# Patient Record
Sex: FEMALE | Race: WHITE | HISPANIC OR LATINO
[De-identification: names, ages, dates, MRNs, and addresses within clinical notes are randomized per-mention and may not be internally consistent; named-entity substitution may affect disease eponyms.]

---

## 2022-01-03 ENCOUNTER — APPOINTMENT (OUTPATIENT)
Dept: ORTHOPEDIC SURGERY | Facility: CLINIC | Age: 42
End: 2022-01-03
Payer: COMMERCIAL

## 2022-01-03 VITALS — HEIGHT: 55 IN | BODY MASS INDEX: 22.91 KG/M2 | WEIGHT: 99 LBS

## 2022-01-03 DIAGNOSIS — Z87.39 PERSONAL HISTORY OF OTHER DISEASES OF THE MUSCULOSKELETAL SYSTEM AND CONNECTIVE TISSUE: ICD-10-CM

## 2022-01-03 PROBLEM — Z00.00 ENCOUNTER FOR PREVENTIVE HEALTH EXAMINATION: Status: ACTIVE | Noted: 2022-01-03

## 2022-01-03 PROCEDURE — 99204 OFFICE O/P NEW MOD 45 MIN: CPT

## 2022-01-03 PROCEDURE — 73030 X-RAY EXAM OF SHOULDER: CPT | Mod: RT

## 2022-01-03 NOTE — HISTORY OF PRESENT ILLNESS
[de-identified] : This patient is a 41-year-old right-hand-dominant female who had a double mastectomy performed about 3 months ago. 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 She had a prior one several years ago with the left I took about 2 or 3 years to resolve. This was due to not using her arms because of healing of the mastectomy. She has pain in her right shoulder left shoulder is not painful.

## 2022-01-03 NOTE — REVIEW OF SYSTEMS
[Arthralgia] : arthralgia [Joint Stiffness] : joint stiffness [Negative] : Heme/Lymph [Joint Pain] : joint pain [Joint Swelling] : no joint swelling

## 2022-01-03 NOTE — PHYSICAL EXAM
[de-identified] : Right shoulder shows no warmth or swelling some tenderness to palpation of the glenohumeral joint forward elevation is 110° abduction is about 60° external rotation is 30° and internal rotation is to the side\par \par Left shoulder shows forward elevation 120° abduction 90° external rotation 45° and internal rotation to the L2 vertebra. Positive Neer impingement of both shoulders neurovascular exam is normal. [de-identified] : Right shoulder x-ray shows no evidence of acute fracture or dislocation

## 2022-01-03 NOTE — DISCUSSION/SUMMARY
[Medication Risks Reviewed] : Medication risks reviewed [de-identified] : She is going to have a reconstruction of her breasts in the month of January. I don't she's a candidate for cortisone but she will discuss this Friday with her breast surgeon. She can continue with physical therapy. Discussed a manipulation under anesthesia to time when she has her breast surgery. I prescribed meloxicam. Followup will be by the portal.

## 2022-01-18 ENCOUNTER — TRANSCRIPTION ENCOUNTER (OUTPATIENT)
Age: 42
End: 2022-01-18

## 2022-01-19 ENCOUNTER — APPOINTMENT (OUTPATIENT)
Dept: ORTHOPEDIC SURGERY | Facility: CLINIC | Age: 42
End: 2022-01-19
Payer: COMMERCIAL

## 2022-01-19 DIAGNOSIS — M25.511 PAIN IN RIGHT SHOULDER: ICD-10-CM

## 2022-01-19 DIAGNOSIS — M75.51 BURSITIS OF RIGHT SHOULDER: ICD-10-CM

## 2022-01-19 PROCEDURE — 20610 DRAIN/INJ JOINT/BURSA W/O US: CPT | Mod: RT

## 2022-01-19 NOTE — PROCEDURE
[de-identified] : After discussion of the risks and benefits, the patient has elected to proceed with an injection. Confirmed that the patient does not have a history of prior adverse reactions, active infections are relevant allergies. There was no erythema, warmth and the skin was clear. The skin was sterilized with alcohol. Ethyl chloride was used as a topical anesthetic. A needle was inserted. The site was injected with a mixture of Kenalog and local anesthetic. The injection was completed without complication and a bandage was applied. The patient tolerated the procedure well and was given post injection instructions.\par \par Medications: right shoulder glenohumeral and subacromial cortisone injection 10 mg of Kenalog and 10 cc 1% lidocaine

## 2022-01-19 NOTE — REVIEW OF SYSTEMS
[Arthralgia] : arthralgia [Joint Pain] : joint pain [Joint Stiffness] : joint stiffness [Joint Swelling] : no joint swelling [Negative] : Heme/Lymph normal...

## 2022-01-19 NOTE — PHYSICAL EXAM
[de-identified] : Right shoulder shows a positive Neer impingement sign a positive Lima test. She has forward elevation 90° external rotation 30° internal rotation to the side abduction 45° left shoulder shows forward elevation 130°, abduction 90° external rotation 45° internal rotation to the L3 vertebra positive Neer impingement positive Lima on the left. Neurovascular exam is normal of both.

## 2022-01-19 NOTE — HISTORY OF PRESENT ILLNESS
[de-identified] : This patient is here for followup of her right and left shoulder. The right is worse than the left her she discussed the possibility of an injection with her treating surgeon and he has no objection to it. She does have a frozen shoulder with right greater than left the notch the reason for doing the injection. She is doing the therapy.

## 2022-01-19 NOTE — DISCUSSION/SUMMARY
[Medication Risks Reviewed] : Medication risks reviewed [de-identified] : Again we discussed treatment options. She is given a cortisone injection for her right frozen shoulder today. She will followup in one month if there is no improvement. He could consider another injection. Postinjection instructions given. She'll begin some physical therapy again.anti-inflammatory medications discussed

## 2022-01-20 ENCOUNTER — TRANSCRIPTION ENCOUNTER (OUTPATIENT)
Age: 42
End: 2022-01-20

## 2022-01-24 ENCOUNTER — TRANSCRIPTION ENCOUNTER (OUTPATIENT)
Age: 42
End: 2022-01-24

## 2023-02-24 ENCOUNTER — APPOINTMENT (OUTPATIENT)
Dept: ORTHOPEDIC SURGERY | Facility: CLINIC | Age: 43
End: 2023-02-24
Payer: COMMERCIAL

## 2023-02-24 DIAGNOSIS — M75.01 ADHESIVE CAPSULITIS OF RIGHT SHOULDER: ICD-10-CM

## 2023-02-24 PROCEDURE — 73030 X-RAY EXAM OF SHOULDER: CPT | Mod: RT

## 2023-02-24 PROCEDURE — 99214 OFFICE O/P EST MOD 30 MIN: CPT | Mod: 25

## 2023-02-24 PROCEDURE — 20611 DRAIN/INJ JOINT/BURSA W/US: CPT | Mod: RT

## 2023-02-24 NOTE — PROCEDURE
[de-identified] : INJECTION RIGHT SHOULDER GH JOINT AND SA SPACE \par \par Patient has demonstrated limited relief from NSAIDS, rest, exercises / PT, and after discussion of the risks and benefits, the patient has elected to proceed with an ULTRASOUND GUIDED injection into the RIGHT GLENOHUMERAL JOINT AND SA SPACE \par  \par Confirmed that the patient does not have history of prior adverse reactions, active, infections, or relevant allergies. There was no effusion, erythema, or warmth, and the skin was clear\par \par The skin was sterilized with alcohol. Ethyl Chloride was used as a topical anesthetic. Routine sterile technique. \par The site was injected UTILIZING ULTRASOUND GUIDANCE to confirm appropriate placement of the needle-\par with a mixture of medication and local anesthetic. The injection was completed without complication and a bandage was applied.\par  \par The patient tolerated the procedure well and was given post-injection instructions.Rec: Cold therapy, analgesics, avoid heavy activity.\par MEDICATION: 4cc of 1% xylocaine + 20mg of KENALOG EACH SITE \par \par

## 2023-02-24 NOTE — HISTORY OF PRESENT ILLNESS
[de-identified] : RIGHT SHOULDER PAIN - SINCE SEPTEMBER 2021 - BILAT MASTECTOMY \par FOLLOW UP- PT HAS BEEN SEEING DR. MENDEZ FOR RIGHT SHOULDER-WAS TREATED WITH CORTISONE INJECTION FOR FROZEN SHOULDER\par MAY 2022 RIGHT SHOULDER FRACTURE  TREATED WITH SLING \par \par PAIN LEVEL: 5/10 \par PT IS GOING TO P.T 1 X WEEK (PT DID MORE THAN 12 SESSIONS) - SOME RELIEF

## 2023-02-24 NOTE — DISCUSSION/SUMMARY
[de-identified] : ULTRASOUND EVALUATION  REVEALS INFLAMMATORY CHANGES WITHOUT SIGNIFICANT TEAR \par PATIENT HAS ELECTED TO PROCEED WITH 2ND KENALOG INJECTION SHOULDER \par RISKS AND BENEFITS DISCUSSED - VERBAL CONSENT OBTAINED \par SEE PROCEDURE NOTE\par \par \par POST INJECTION INSTRUCTIONS:\par \par INJECTION THERAPY HANDOUT PROVIDED\par \par COLD THERAPY , ANALGESICS PRN\par \par HOME  EXERCISES QD - FROZEN SHOULDER \par PULLEY EXERCISE DAILY \par \par RESUME  P.T.  1 X WEEK  \par \par MRI IF NO RELIEF 12 WEEKS\par

## 2023-02-24 NOTE — PHYSICAL EXAM
[de-identified] : PHYSICAL EXAM  RIGHT  SHOULDER\par \par NORMAL POSTURE / SCAPULAR PROTRACTION\par AROM 120 / 110 / 70 / 0 \par TENDER: SA REGION / AC JOINT / GH JOINT / BICEPS GROOVE\par \par SPECIAL TESTING :\par VIVAR - POSITIVE \par CARLOS - POSITIVE \par SPEED TEST - POSITIVE\par \par ESCOBAR - NEGATIVE \par APPREHENSION AND SUPPRESSION - NEGATIVE \par \par RC STRENGTH TESTING \par SS:  5/5\par SUB 5/5\par IS     5/5\par BICEPS  5/5\par \par SENSATION  - GROSSLY INTACT\par \par \par  [de-identified] : RIGHT SHOULDER XRAY (2 VIEWS - AP AND OUTLET) -  \par HEALED FRACTURE \par NO OBVIOUS FRACTURE ,  SEPARATION OR DISLOCATION \par NO SIGNIFICANT OSTEOARTHRITIS,\par TYPE 2B ACROMION - LOW LYING \par CSA= 49 - LATERAL SPUR\par

## 2023-05-05 ENCOUNTER — APPOINTMENT (OUTPATIENT)
Dept: ORTHOPEDIC SURGERY | Facility: CLINIC | Age: 43
End: 2023-05-05
Payer: COMMERCIAL

## 2023-05-05 DIAGNOSIS — M67.813 OTHER SPECIFIED DISORDERS OF TENDON, RIGHT SHOULDER: ICD-10-CM

## 2023-05-05 PROCEDURE — 20611 DRAIN/INJ JOINT/BURSA W/US: CPT | Mod: RT

## 2023-05-05 PROCEDURE — 99213 OFFICE O/P EST LOW 20 MIN: CPT | Mod: 25

## 2023-05-05 RX ORDER — TRAMADOL HYDROCHLORIDE 50 MG/1
50 TABLET, COATED ORAL
Qty: 56 | Refills: 0 | Status: ACTIVE | COMMUNITY
Start: 2023-05-05 | End: 1900-01-01

## 2023-05-05 NOTE — PHYSICAL EXAM
[de-identified] : PHYSICAL EXAM  RIGHT  SHOULDER\par \par NORMAL POSTURE / SCAPULAR PROTRACTION\par AROM 140 / 140 / 70 / 5 \par TENDER: SA REGION ANTERIOR \par \par SPECIAL TESTING :\par VIVAR - POSITIVE \par CARLOS - POSITIVE \par SPEED TEST - POSITIVE\par \par ESCOBAR - NEGATIVE \par APPREHENSION AND SUPPRESSION - NEGATIVE \par \par RC STRENGTH TESTING \par SS:  5/5\par SUB 5/5\par IS     5/5\par BICEPS  5/5\par \par SENSATION  - GROSSLY INTACT\par \par \par

## 2023-05-05 NOTE — DISCUSSION/SUMMARY
[de-identified] : \par PATIENT HAS ELECTED TO PROCEED WITH KENALOG INJECTION SHOULDER \par RISKS AND BENEFITS DISCUSSED - VERBAL CONSENT OBTAINED \par SEE PROCEDURE NOTE\par \par \par POST INJECTION INSTRUCTIONS:\par \par INJECTION THERAPY HANDOUT PROVIDED\par \par COLD OR HEAT  THERAPY , ANALGESICS PRN\par \par HOME  EXERCISES QD - CORNER STRETCH AND SLEEPER STRETCH \par \par COMPLETE P.T.  1 X WEEK\par \par MRI IF NO RELIEF 12 WEEKS\par

## 2023-05-05 NOTE — PROCEDURE
[de-identified] : INJECTION RIGHT SHOULDER GH JOINT AND SA SPACE \par \par Patient has demonstrated limited relief from NSAIDS, rest, exercises / PT, and after discussion of the risks and benefits, the patient has elected to proceed with an ULTRASOUND GUIDED injection into the RIGHT GLENOHUMERAL JOINT AND SA SPACE \par  \par Confirmed that the patient does not have history of prior adverse reactions, active, infections, or relevant allergies. There was no effusion, erythema, or warmth, and the skin was clear\par \par The skin was sterilized with alcohol. Ethyl Chloride was used as a topical anesthetic. Routine sterile technique. \par The site was injected UTILIZING ULTRASOUND GUIDANCE to confirm appropriate placement of the needle-\par with a mixture of medication and local anesthetic. The injection was completed without complication and a bandage was applied.\par  \par The patient tolerated the procedure well and was given post-injection instructions.Rec: Cold therapy, analgesics, avoid heavy activity.\par MEDICATION: 4cc of 1% xylocaine + 20mg of KENALOG EACH SITE

## 2023-05-05 NOTE — HISTORY OF PRESENT ILLNESS
[de-identified] : RIGHT SHOULDER \par PAIN LEVEL: 3/10 \par FOLLOW UP \par FEBRUARY 23, 2023- CORTISONE INJECTION- HELPFUL \par PATIENT IS GOING TO PHYSICAL THERPAY 1 X WEEK-HELPFUL (COMPLETED MORE THAN 12 SESSIONS)

## 2024-01-26 ENCOUNTER — APPOINTMENT (OUTPATIENT)
Dept: ORTHOPEDIC SURGERY | Facility: CLINIC | Age: 44
End: 2024-01-26
Payer: COMMERCIAL

## 2024-01-26 DIAGNOSIS — M75.111 INCOMPLETE ROTATOR CUFF TEAR OR RUPTURE OF RIGHT SHOULDER, NOT SPECIFIED AS TRAUMATIC: ICD-10-CM

## 2024-01-26 DIAGNOSIS — M75.41 IMPINGEMENT SYNDROME OF RIGHT SHOULDER: ICD-10-CM

## 2024-01-26 PROCEDURE — 99213 OFFICE O/P EST LOW 20 MIN: CPT

## 2024-01-26 RX ORDER — CELECOXIB 200 MG/1
200 CAPSULE ORAL TWICE DAILY
Qty: 60 | Refills: 5 | Status: ACTIVE | COMMUNITY
Start: 2024-01-26 | End: 1900-01-01

## 2024-01-26 NOTE — DISCUSSION/SUMMARY
[de-identified] : CONSIDER ARTHROSCOPIC TREATMENT  SURGERY ORDER PLACED  PREOP SHOULDER SURGERY DISCUSSION: IMPINGEMENT WITH GRADE 1 PARTIAL TEAR    PROCEDURE DISCUSSED - QUESTIONS ANSWERED PATIENT WISHES TO PROCEED  POST OP CARE AND LIMITATIONS REVIEWED - HANDOUT PROVIDED   COLD PACKS RECOMMENDED ANALGESICS AND  ANTI NAUSEA MEDS PRESCRIBED  COLACE RECOMMENDED   THERE ARE NO GUARANTEES THAT ALL SYMPTOMS WILL BE ALLEVIATED   SHOULDER ARTHROSCOPY, ACROMIOPLASTY, DEBRIDEMENT,  RC REPAIR AND LABRUM REPAIRS- ON AVERAGE 75- 85% SATISFACTORY RESULTS FOR TEARS < 3CM AFTER 9-12 MONTHS HEALING AND REHABILITATION.   REPAIRS WILL REQUIRE STRICT SHOULDER IMMOBILIZER 4-6 WEEKS  RC TEARS 3CM OR LARGER MAY REQUIRE COLLAGEN PATCH AUGMENTATION GENERALLY HAVE LESS SATISFACTORY RESULTS  PHYSICAL THERAPY REQUIRED 2X WEEK FOR  MINIMUM 8-12 WEEKS FOR ALL PROCEDURES  CONTINUED HOME EXERCISES 6-9 MONTHS AFTER THAT REQUIRED FOR OPTIMAL OUTCOMES   ROUTINE SURGICAL AND ANESTHETIC RISKS INCLUDE RISK OF SURGICAL INFECTION, ANESTHETIC COMPLICATION OR ALLERGY, POSSIBLE RETEARS OR PROGRESSION OF TEAR, STIFFNESS OF SHOULDER AND UNSATISFACTORY OUTCOMES  PATIENT UNDERSTANDS AND WISHES TO PROCEED

## 2024-01-26 NOTE — HISTORY OF PRESENT ILLNESS
[de-identified] : RIGHT SHOULDER PAIN  FOLLOW UP  PAIN LEVEL: 3/10  FEB 24, 2023-CORTISONE INJ  MAY 5, 2023-CORTISONE INJ-HELPFUL  MRI RESULTS TODAY       PREVIOUS HPI RIGHT SHOULDER  PAIN LEVEL: 3/10  FOLLOW UP  FEBRUARY 23, 2023- CORTISONE INJECTION- HELPFUL  PATIENT IS GOING TO PHYSICAL THERPAY 1 X WEEK-HELPFUL (COMPLETED MORE THAN 12 SESSIONS)

## 2024-01-26 NOTE — PHYSICAL EXAM
[de-identified] : PHYSICAL EXAM  RIGHT  SHOULDER  NORMAL POSTURE / SCAPULAR PROTRACTION AROM 140 / 140 / 70 / 5  TENDER: SA REGION ANTERIOR   SPECIAL TESTING : VIVAR - POSITIVE  CARLOS - POSITIVE  SPEED TEST - POSITIVE  ESCOBAR - NEGATIVE  APPREHENSION AND SUPPRESSION - NEGATIVE   RC STRENGTH TESTING  SS:  5/5 SUB 5/5 IS     5/5 BICEPS  5/5  SENSATION  - GROSSLY INTACT    [de-identified] : Date of Exam: 01-   EXAM:  MRI RIGHT SHOULDER WITHOUT CONTRAST    IMPRESSION:    1. Laterally downsloping acromion, small to moderate, broad-based subacromial enthesophyte accentuating a portion of the acromial rim and undersurface, corresponding to a broad attachment of moderately thickened coracoacromial ligament. 2. Mild subacromial/subdeltoid bursitis. 3. Study negative for full-thickness rotator cuff tear. There is mild thinning of the distal supraspinatus tendon due to subtle erosive change along the tendon's articular sided surface. 4. Suggestion of mild posterior instability, with mild arthrosis in the glenohumeral joint, mostly along the posterior half of the glenoid. 5. There is degenerative tearing of the corresponding posterior labrum.

## 2024-02-09 RX ORDER — MELOXICAM 15 MG/1
15 TABLET ORAL DAILY
Qty: 30 | Refills: 3 | Status: ACTIVE | COMMUNITY
Start: 2022-01-03 | End: 1900-01-01